# Patient Record
Sex: FEMALE | Race: WHITE
[De-identification: names, ages, dates, MRNs, and addresses within clinical notes are randomized per-mention and may not be internally consistent; named-entity substitution may affect disease eponyms.]

---

## 2019-02-21 ENCOUNTER — HOSPITAL ENCOUNTER (EMERGENCY)
Dept: HOSPITAL 61 - ER | Age: 69
Discharge: HOME | End: 2019-02-21
Payer: MEDICARE

## 2019-02-21 VITALS — SYSTOLIC BLOOD PRESSURE: 106 MMHG | DIASTOLIC BLOOD PRESSURE: 63 MMHG

## 2019-02-21 VITALS — HEIGHT: 63 IN | WEIGHT: 149 LBS | BODY MASS INDEX: 26.4 KG/M2
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
## 2019-02-21 DIAGNOSIS — Z88.8: ICD-10-CM

## 2019-02-21 DIAGNOSIS — S20.212A: Primary | ICD-10-CM
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
## 2019-02-21 DIAGNOSIS — K21.9: ICD-10-CM
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
## 2019-02-21 DIAGNOSIS — F10.20: ICD-10-CM

## 2019-02-21 DIAGNOSIS — E78.00: ICD-10-CM

## 2019-02-21 DIAGNOSIS — I10: ICD-10-CM

## 2019-02-21 PROCEDURE — 99284 EMERGENCY DEPT VISIT MOD MDM: CPT

## 2019-02-21 PROCEDURE — 36415 COLL VENOUS BLD VENIPUNCTURE: CPT

## 2019-02-21 PROCEDURE — 93005 ELECTROCARDIOGRAM TRACING: CPT

## 2019-02-21 PROCEDURE — 71046 X-RAY EXAM CHEST 2 VIEWS: CPT
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
## 2019-02-22 NOTE — EKG
[facility]

              [address] [place] [address]

Test Date:    2019               Test Time:    15:04:26

Pat Name:     HALLIE BONNER         Department:   

Patient ID:   [id number]           Room:          

Gender:       F                        Technician:   

:          1950               Requested By: AURELIO FERGUSON

Order Number: [id number]           Reading MD:   Guillaume Yen

                                 Measurements

Intervals                              Axis          

Rate:         81                       P:            1

VA:           142                      QRS:          1

QRSD:         78                       T:            11

QT:           400                                    

QTc:          471                                    

                           Interpretive Statements

SINUS RHYTHM



Electronically Signed On 3-5-2019 10:12:54 CST by Guillaume Yen